# Patient Record
(demographics unavailable — no encounter records)

---

## 2024-12-26 NOTE — HISTORY OF PRESENT ILLNESS
[FreeTextEntry1] : Admission Date 30-Sep-2024 to 19-Oct-2024  Reason for Admission Left [facility]  78 y/o M with PMHx of multiple previous PCI's with stenting (x7 stents), hypertension, hyperlipidemia, diabetes presenting to [facility] on 9/15 with chest pain radiating to his left shoulder.  States he feels similar to prior episodes of angina, however less severe this time.  States during the week had multiple episodes of chest pain in the evenings after eating, however these were short and self-limited.  Last night pain started again in the evening and has been continuous but waxes and wanes in intensity. Last [facility] in 5/2024 with PCI to ostial RCA and LCx. LAD with moderate disease iFR negative 0.96. Cardiology was consulted, recommended continue with DAPT therapy and heparin gtt for unstable angina. Critical lab value ApTT >200. Hep gtt held and rate decreased. EKG showed NSR with 1st degree AV block.  Suspected NSTEMI S/p PCI with POBA/laser to 95% RCA ISR via RRA on 9/16.  Hospital course c/b RRT with AMS and increasing lethargy and worsening headache and neck pain. Emergent CTH showed acute intraparenchymal hemorrhage within the medial left temporal lobe with intraventricular extension and localized mass effect likely as a rare complication from heparin and DAPT therapy. Patient was treated and reversed the complication of heparin with  protamine sulfate, given desmopressin and SDP platelets.  Patient require intubation due to worsening mental status. Patient was transferred to NICU for further management. Urology was consulted for gross hematuria s/p ISCl, 20 Fr. Coude was placed, most likely as a result of traumatic insertion.   Patient was extubated on 9/18 and downgraded to the medical floor on 9/19. Patient had multiple  RRT on 9/21 for worsening headache treated with tylenol, new onset of blurry vision and syncope. Repeat CTH was stable. Upon further questions, the blurry vision persisted since the cadiac cath and is not new. Syncope most likely from vasovagal. Neurology cleared to resume aspirin on 9/22.  On 9/22, patient had another RRT for suspected seizure activity with BL rhythmic upper and lower extremity shaking w/ spontaneous resolution. No need vEEG per neuro. Garcia removed 9/23 and passed TOV. Hematuria resolved. ID was consulted for leukocytosis, tachycardia and rectal temp of 100.7 F. Treated empirically with Zosyn and Vanco for suspected sepsis 2/2 UTI. On 9/26, + UA and Urine cx (proteus), blood cx-neg. Continue treatment with Rocephin. CT chest showed b/l small pleural effusions, negative for PNA. Nephrology was consulted for hyponatremia 2/2 vasogenic edema. Treated with sodium chloride PO and hypertonic saline solution IVF. Patient is optimized and Patient was evaluated by PM&R and therapy for functional deficits, gait/ADL impairments and acute rehabilitation was recommended. Patient was cleared for discharge to [facility] IRU on 9/30/24.  REHAB COURSE:  Rehab Course was significant for hyponatremia and nephrology was consulted. Patient received 1 dose of tolvaptan and weaned off salt tabs.  Patient placed on fluid restriction and sodium levels improved.  Course notable for anemia likely in the setting of iron deficiency and received 2 doses of IV iron and then transition to oral supplementation.  Patient found to have e coli ESBL UTI and treated with a course of ertapenem.  Patient had symptomatic orthostatic hypotension in therapy.  Anti-hypertensive were discontinued, started on midodrine and cardiology consulted. Florinef was also added and later discontinued.  Blood pressure maintained during therapy.  Patient started on memantine for aphasia.  Patient tolerated course of inpatient PT/OT/SLP rehab with significant functional improvements and met rehab goals prior to discharge. Patient was medically stable and optimized for discharge home with follow-up with PM&R, primary care, neurology, and nephrology  Discharge Functional Status: SLP Voice (Communication): EMST-75 fair plus to good air passage Expression (Communication): convergent sentence completion 40% Comprehension (Communication): 2-step 0%, simple y/n 100%, mod y/n 70% Memory (Cognition): STM 12% Attention (Cognition): selective attention 50% Swallowing (Swallow Function): lingual control/coordination fair plus to good  PT Ambulated over 150 feet with RW and supervision; ambulated over 150 feet without device with contact guard; decreased clearance right lower extremity in swing.  Negotiated up and down 12-6 inch steps with one handrail, step-to pattern, and supervision. Negotiated up and down 6-inch curb with RW and supervision.  OT Set up -- Eating; Supervision-- grooming;  CG-- UBD/LBD; Min A shower transfers; CG toilet transfers and toileting  Patient attending outpatient PT & OT Twice weekly, Speech is TIW at [facility].   Patient is getting better with therapy and time.    Therapy Notes reviewed-- Speech therapy note 12/16--sentence completion-8 out of 12 given moderate to maximum assistance-able to complete open ended phrases with an appropriate word with notable word finding challenges and nonspecific utterances.  Required and benefited from visual support such as images and orthographic's, semantic cueing, guided use of [employer] locations and gestures and for anomic cueing. Word fluency test 8 words per prompts and 2 given maximum assist.  Patient was tasks with generating semantic believe related terms to written work stimulus word.  Benefited from semantic cueing, guided question prompts, carrier phrases,, for anomic cueing and written support.  OT note 12/16--initially demonstrated max difficulty sequencing steps-basic three-step directive-requiring step-by-step instruction to recall and follow directions-by end of tasks, amount of assistance required downgraded to minimum assistance.  He required maximum assistance to determine the appropriate section of menu to find a given item-example omelette in the breakfast session section-demonstrating poor reasoning.  Client able to total up dollar amounts with 100% accuracy with the use of a calculator.  Able to follow two-step directions with better accuracy as well as due to familiarity of tasks.  Significant difficulty with novel task continues to be noted.  PT no [id number] performed to improve balance and improve independence with all functional mobility.  On today's session focused on dynamic and static balance and lower extremity strengthening.  Appropriately challenged with EC modified tandem stance, demonstrating good tolerance to 10 seconds bouts-3 times with 5 seconds breaks before switching feet.  Patient with 2 instances of loss of balance during extraocular head turns but demonstrated good lateral stepping strategy and use of upper extremity on Boulet bars to catch balance independently.  Pt states he has word finding difficulty in [place] and English  Function: Ambulating independently at home and in community Negotiates stairs independently-- has a flight at home Grooming, dressing, standing shower, toileting-- independently  no falls.   Takes meds with help of Pill box.    Pt. stopped midodrine and tamsulosin and memantine  Current Meds: duasteride 5mg atorvastatin 80 Metoprolol 25mg Janumet 50 mg BID ASA 81mg  glimeride 4mg.

## 2024-12-26 NOTE — REASON FOR VISIT
[Follow-Up] : a follow-up visit [Spouse] : spouse [Family Member] : family member [FreeTextEntry1] : left MCA infarct

## 2024-12-26 NOTE — HISTORY OF PRESENT ILLNESS
[FreeTextEntry1] : Admission Date 30-Sep-2024 to 19-Oct-2024  Reason for Admission Left [facility]  78 y/o M with PMHx of multiple previous PCI's with stenting (x7 stents), hypertension, hyperlipidemia, diabetes presenting to [facility] on 9/15 with chest pain radiating to his left shoulder.  States he feels similar to prior episodes of angina, however less severe this time.  States during the week had multiple episodes of chest pain in the evenings after eating, however these were short and self-limited.  Last night pain started again in the evening and has been continuous but waxes and wanes in intensity. Last [facility] in 5/2024 with PCI to ostial RCA and LCx. LAD with moderate disease iFR negative 0.96. Cardiology was consulted, recommended continue with DAPT therapy and heparin gtt for unstable angina. Critical lab value ApTT >200. Hep gtt held and rate decreased. EKG showed NSR with 1st degree AV block.  Suspected NSTEMI S/p PCI with POBA/laser to 95% RCA ISR via RRA on 9/16.  Hospital course c/b RRT with AMS and increasing lethargy and worsening headache and neck pain. Emergent CTH showed acute intraparenchymal hemorrhage within the medial left temporal lobe with intraventricular extension and localized mass effect likely as a rare complication from heparin and DAPT therapy. Patient was treated and reversed the complication of heparin with  protamine sulfate, given desmopressin and SDP platelets.  Patient require intubation due to worsening mental status. Patient was transferred to NICU for further management. Urology was consulted for gross hematuria s/p ISCl, 20 Fr. Coude was placed, most likely as a result of traumatic insertion.   Patient was extubated on 9/18 and downgraded to the medical floor on 9/19. Patient had multiple  RRT on 9/21 for worsening headache treated with tylenol, new onset of blurry vision and syncope. Repeat CTH was stable. Upon further questions, the blurry vision persisted since the cadiac cath and is not new. Syncope most likely from vasovagal. Neurology cleared to resume aspirin on 9/22.  On 9/22, patient had another RRT for suspected seizure activity with BL rhythmic upper and lower extremity shaking w/ spontaneous resolution. No need vEEG per neuro. Garcia removed 9/23 and passed TOV. Hematuria resolved. ID was consulted for leukocytosis, tachycardia and rectal temp of 100.7 F. Treated empirically with Zosyn and Vanco for suspected sepsis 2/2 UTI. On 9/26, + UA and Urine cx (proteus), blood cx-neg. Continue treatment with Rocephin. CT chest showed b/l small pleural effusions, negative for PNA. Nephrology was consulted for hyponatremia 2/2 vasogenic edema. Treated with sodium chloride PO and hypertonic saline solution IVF. Patient is optimized and Patient was evaluated by PM&R and therapy for functional deficits, gait/ADL impairments and acute rehabilitation was recommended. Patient was cleared for discharge to [facility] IRU on 9/30/24.  REHAB COURSE:  Rehab Course was significant for hyponatremia and nephrology was consulted. Patient received 1 dose of tolvaptan and weaned off salt tabs.  Patient placed on fluid restriction and sodium levels improved.  Course notable for anemia likely in the setting of iron deficiency and received 2 doses of IV iron and then transition to oral supplementation.  Patient found to have e coli ESBL UTI and treated with a course of ertapenem.  Patient had symptomatic orthostatic hypotension in therapy.  Anti-hypertensive were discontinued, started on midodrine and cardiology consulted. Florinef was also added and later discontinued.  Blood pressure maintained during therapy.  Patient started on memantine for aphasia.  Patient tolerated course of inpatient PT/OT/SLP rehab with significant functional improvements and met rehab goals prior to discharge. Patient was medically stable and optimized for discharge home with follow-up with PM&R, primary care, neurology, and nephrology  Discharge Functional Status: SLP Voice (Communication): EMST-75 fair plus to good air passage Expression (Communication): convergent sentence completion 40% Comprehension (Communication): 2-step 0%, simple y/n 100%, mod y/n 70% Memory (Cognition): STM 12% Attention (Cognition): selective attention 50% Swallowing (Swallow Function): lingual control/coordination fair plus to good  PT Ambulated over 150 feet with RW and supervision; ambulated over 150 feet without device with contact guard; decreased clearance right lower extremity in swing.  Negotiated up and down 12-6 inch steps with one handrail, step-to pattern, and supervision. Negotiated up and down 6-inch curb with RW and supervision.  OT Set up -- Eating; Supervision-- grooming;  CG-- UBD/LBD; Min A shower transfers; CG toilet transfers and toileting  Patient attending outpatient PT & OT Twice weekly, Speech is TIW at [facility].   Patient is getting better with therapy and time.    Therapy Notes reviewed-- Speech therapy note 12/16--sentence completion-8 out of 12 given moderate to maximum assistance-able to complete open ended phrases with an appropriate word with notable word finding challenges and nonspecific utterances.  Required and benefited from visual support such as images and orthographic's, semantic cueing, guided use of [employer] locations and gestures and for anomic cueing. Word fluency test 8 words per prompts and 2 given maximum assist.  Patient was tasks with generating semantic believe related terms to written work stimulus word.  Benefited from semantic cueing, guided question prompts, carrier phrases,, for anomic cueing and written support.  OT note 12/16--initially demonstrated max difficulty sequencing steps-basic three-step directive-requiring step-by-step instruction to recall and follow directions-by end of tasks, amount of assistance required downgraded to minimum assistance.  He required maximum assistance to determine the appropriate section of menu to find a given item-example omelette in the breakfast session section-demonstrating poor reasoning.  Client able to total up dollar amounts with 100% accuracy with the use of a calculator.  Able to follow two-step directions with better accuracy as well as due to familiarity of tasks.  Significant difficulty with novel task continues to be noted.  PT no [id number] performed to improve balance and improve independence with all functional mobility.  On today's session focused on dynamic and static balance and lower extremity strengthening.  Appropriately challenged with EC modified tandem stance, demonstrating good tolerance to 10 seconds bouts-3 times with 5 seconds breaks before switching feet.  Patient with 2 instances of loss of balance during extraocular head turns but demonstrated good lateral stepping strategy and use of upper extremity on Boulet bars to catch balance independently.  Pt states he has word finding difficulty in [place] and English  Function: Ambulating independently at home and in community Negotiates stairs independently-- has a flight at home Grooming, dressing, standing shower, toileting-- independently  no falls.   Takes meds with help of Pill box.    Pt. stopped midodrine and tamsulosin and memantine  Current Meds: duasteride 5mg atorvastatin 80 Metoprolol 25mg Janumet 50 mg BID ASA 81mg  glimeride 4mg.

## 2024-12-26 NOTE — ASSESSMENT
[FreeTextEntry1] : Will benefit from continued Speech and OT outpatient to address aphasia, cognitive deficits and IADLs.    Pt. making great progress.  Cont. PT until completion

## 2024-12-26 NOTE — PHYSICAL EXAM
[FreeTextEntry1] : Constitutional: Alert, awake, no acute distress  Neuro: AAOx3, Recall 2/3 spontaneously.  3/3 with cues Attention--able to complete DOWB,  and serial 7's x 1  CN: intact no nystagmus, visual fields intact, PERRLA, EOMI, no facial weakness or sensory deficit, shoulder migue intact, tongue midline Motor 5/5 bilat UE and LE Sens intact bilat UE and LE Coordination intact--FNF and HTS intact  gait-- reciprocal gait pattern.

## 2025-03-07 NOTE — PHYSICAL EXAM
[FreeTextEntry1] : Constitutional: Alert, awake, no acute distress  Neuro: AAOx3, Recall 0/3 spontaneously. 1/3 with cat. cues,  Attention--able to complete DOWB, and serial 7's x 1  CN: no nystagmus, visual fields intact, PERRLA, EOMI, no facial weakness or sensory deficit, shoulder migue intact, tongue midline Motor 5/5 bilat UE and LE Sens intact bilat UE and LE Coordination intact--FNF and HTS intact  gait-- reciprocal gait pattern.

## 2025-03-07 NOTE — ASSESSMENT
[FreeTextEntry1] : Patient encouraged to continue Speech therapy -- even at least for a few more sessions to be comfortable with cognitive exercises to do at home, etc.  but pt. declines.   Agrees to do cognitive exercises recommended at home-- discussed  cognitive activities and community programs such as day programs or senior centers for cognitive activity. .  Recommended to wife that she can have patient do games, and activities that stimulate his cognitive functioning.    --wife states she will benefit from having therapy at I send information to her about specific cognitive apps, games, activities and possibly worksheets that patient can do.    All questions answered.  f/u in 6 months

## 2025-03-07 NOTE — HISTORY OF PRESENT ILLNESS
[FreeTextEntry1] : Admission Date 30-Sep-2024 to 19-Oct-2024  Reason for Admission Left [facility]  76 y/o M with PMHx of multiple previous PCI's with stenting (x7 stents), hypertension, hyperlipidemia, diabetes presenting to [facility] on 9/15 with chest pain radiating to his left shoulder. Last [facility] in 5/2024 with PCI to ostial RCA and LCx. LAD with moderate disease -- Pt. started on  DAPT therapy and heparin gtt for unstable angina.  Suspected NSTEMI S/p PCI  Hospital course c/b RRT with AMS and increasing lethargy and worsening headache and neck pain. Emergent CTH showed acute intraparenchymal hemorrhage within the medial left temporal lobe with intraventricular extension and localized mass effect likely as a rare complication from heparin and DAPT therapy. Patient was treated and reversed the complication of heparin with protamine sulfate, given desmopressin and SDP platelets. Patient required intubation due to worsening mental status. Patient was managed in NICU.  extubated on 9/18 and downgraded to the medical floor on 9/19.  On 9/22, patient had another RRT for suspected seizure activity with BL rhythmic upper and lower extremity shaking w/ spontaneous resolution. No need vEEG per neuro. Nephrology was consulted for hyponatremia 2/2 vasogenic edema. Treated with sodium chloride PO and hypertonic saline solution IVF.  Rehab Course was significant for hyponatremia and nephrology was consulted. Patient received 1 dose of tolvaptan and weaned off salt tabs. Patient placed on fluid restriction and sodium levels improved. Course notable for anemia likely in the setting of iron deficiency and received 2 doses of IV iron and then transition to oral supplementation. Patient found to have e coli ESBL UTI and treated with a course of ertapenem. Patient had symptomatic orthostatic hypotension in therapy. Anti-hypertensive were discontinued, started on midodrine and cardiology consulted. Florinef was also added and later discontinued. Blood pressure maintained during therapy. Patient started on memantine for aphasia.  pt. last seen 12/17/24.  Pt. was attending therapy at [facility].  He was discharged from PT in late Jan. as he met his goals. Pt. stopped attending OT and speech therapy the week of 2/10 as he felt he did not need it.    I spoke with pt's wife on 2/13 as she was concerned that he did not want to attend therapy anymore and how to address.  Wife advised that patient can discharge from OT as discharge had been planned for that coming week.  Had discussed with patient's OT at [facility].  They noted that his performance tends to fluctuate and is limited by cognitive deficits and poor insight.  They recommended cognitive activities and community programs such as day programs or senior centers for cognitive activity. - Patient was encouraged to continue with outpatient speech therapy as he would continue to benefit from cognitive remediation.  Patient declined.  Recommended to wife that she can have patient do games, and activities that stimulate his cognitive functioning.  She noted that his speech and cognitive functioning has improved.  Pt. notes some aphasia, dysphonia.   Pt. states it takes some time to process information and find the words he wants to say.   His comprehension is good.   Wife states his memory regarding things related to his office and  things at home is pretty good.   IADLs--  independent with medication management, knows his appointments, chores-- cleaning, etc. like normal, organizing, etc.   Pt. walks independently in the community.

## 2025-03-07 NOTE — HISTORY OF PRESENT ILLNESS
[FreeTextEntry1] : Admission Date 30-Sep-2024 to 19-Oct-2024  Reason for Admission Left [facility]  78 y/o M with PMHx of multiple previous PCI's with stenting (x7 stents), hypertension, hyperlipidemia, diabetes presenting to [facility] on 9/15 with chest pain radiating to his left shoulder. Last [facility] in 5/2024 with PCI to ostial RCA and LCx. LAD with moderate disease -- Pt. started on  DAPT therapy and heparin gtt for unstable angina.  Suspected NSTEMI S/p PCI  Hospital course c/b RRT with AMS and increasing lethargy and worsening headache and neck pain. Emergent CTH showed acute intraparenchymal hemorrhage within the medial left temporal lobe with intraventricular extension and localized mass effect likely as a rare complication from heparin and DAPT therapy. Patient was treated and reversed the complication of heparin with protamine sulfate, given desmopressin and SDP platelets. Patient required intubation due to worsening mental status. Patient was managed in NICU.  extubated on 9/18 and downgraded to the medical floor on 9/19.  On 9/22, patient had another RRT for suspected seizure activity with BL rhythmic upper and lower extremity shaking w/ spontaneous resolution. No need vEEG per neuro. Nephrology was consulted for hyponatremia 2/2 vasogenic edema. Treated with sodium chloride PO and hypertonic saline solution IVF.  Rehab Course was significant for hyponatremia and nephrology was consulted. Patient received 1 dose of tolvaptan and weaned off salt tabs. Patient placed on fluid restriction and sodium levels improved. Course notable for anemia likely in the setting of iron deficiency and received 2 doses of IV iron and then transition to oral supplementation. Patient found to have e coli ESBL UTI and treated with a course of ertapenem. Patient had symptomatic orthostatic hypotension in therapy. Anti-hypertensive were discontinued, started on midodrine and cardiology consulted. Florinef was also added and later discontinued. Blood pressure maintained during therapy. Patient started on memantine for aphasia.  pt. last seen 12/17/24.  Pt. was attending therapy at [facility].  He was discharged from PT in late Jan. as he met his goals. Pt. stopped attending OT and speech therapy the week of 2/10 as he felt he did not need it.    I spoke with pt's wife on 2/13 as she was concerned that he did not want to attend therapy anymore and how to address.  Wife advised that patient can discharge from OT as discharge had been planned for that coming week.  Had discussed with patient's OT at [facility].  They noted that his performance tends to fluctuate and is limited by cognitive deficits and poor insight.  They recommended cognitive activities and community programs such as day programs or senior centers for cognitive activity. - Patient was encouraged to continue with outpatient speech therapy as he would continue to benefit from cognitive remediation.  Patient declined.  Recommended to wife that she can have patient do games, and activities that stimulate his cognitive functioning.  She noted that his speech and cognitive functioning has improved.  Pt. notes some aphasia, dysphonia.   Pt. states it takes some time to process information and find the words he wants to say.   His comprehension is good.   Wife states his memory regarding things related to his office and  things at home is pretty good.   IADLs--  independent with medication management, knows his appointments, chores-- cleaning, etc. like normal, organizing, etc.   Pt. walks independently in the community.

## 2025-04-09 NOTE — CONSULT LETTER
[Dear  ___] : Dear  [unfilled], [Consult Letter:] : I had the pleasure of evaluating your patient, [unfilled]. [Please see my note below.] : Please see my note below. [Consult Closing:] : Thank you very much for allowing me to participate in the care of this patient.  If you have any questions, please do not hesitate to contact me. [Sincerely,] : Sincerely, [DrAndrea  ___] : Dr. MAHAJAN [DrAndrea ___] : Dr. MAHAJAN [FreeTextEntry2] : Elian Clark MD [FreeTextEntry3] : Richard B. Libman, MD, FRCPC  , Neurology  Co-Director, Stroke Center Professor of Neurology [facility] at [facility]

## 2025-04-09 NOTE — CONSULT LETTER
[Dear  ___] : Dear  [unfilled], [Consult Letter:] : I had the pleasure of evaluating your patient, [unfilled]. [Please see my note below.] : Please see my note below. [Consult Closing:] : Thank you very much for allowing me to participate in the care of this patient.  If you have any questions, please do not hesitate to contact me. [Sincerely,] : Sincerely, [DrAndrea  ___] : Dr. MAHAJAN [DrAndrea ___] : Dr. MAHAJAN [FreeTextEntry2] : Elian Clark MD [FreeTextEntry3] : Richard B. Libman, MD, FRCPC  , Neurology  Co-Director, Stroke Center Professor of Neurology [facility] at [facility]

## 2025-04-09 NOTE — HISTORY OF PRESENT ILLNESS
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  HPI from [facility] 9/16/24: He developed sudden onset of headache and altered mental status following balloon angioplasty. CT head showed intraparenchymal hemorrhage with ventricular extension on the left side. Patient was on heparin drip and dual antiplatelet therapy.   Workup includes: CT Head 9/16/24: Acute intraparenchymal hemorrhage within the medial left temporal lobe with intraventricular extension and localized mass effect. CTA Neck 9/16/24: No large vessel occlusion or significant stenosis. CTA Head 9/16/24: No cerebral aneurysm, large vessel occlusion, or significant stenosis. MRI Brain 9/17/24: Large intraparenchymal hemorrhage within the medial LEFT temporal lobe extending into the LEFT basal ganglia measuring 6.5 cm AP by 4.0 cm TRV by 3.6 cm CC. There is extension of hemorrhage into the LEFT lateral ventricle and minimally in the dependent portion of the RIGHT lateral ventricle and a small 1.5 cm intraparenchymal hematoma in the posterior inferior LEFT frontal lobe. Mild surrounding edema is noted with effacement of the LEFT lateral ventricle but no midline shift. Scattered subarachnoid hemorrhage is seen in the BILATERAL frontal, occipital and temporal lobes. Mild to moderate periventricular, deep and subcortical chronic white matter ischemia is noted. No abnormal enhancement is seen. CT Head 10/17/24: Normal temporal evolution of previously seen left basal ganglia and left frontotemporal hemorrhagic lesions. No new acute intracranial hemorrhage.  Carotid Doppler (3/25/2025): Mild heterogeneous plaque with less than 40% stenosis bilaterally. Extracranial vertebral arteries: Anterograde flow with normal flow resistance.  Transcranial Doppler (3/25/2025): Normal.  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 No acute intracranial hemorrhage, midline shift or hydrocephalus. Left mesial temporal encephalomalacia/gliosis.    4/7/25 He presents to the office today with his wife. He has residual difficulties with naming. He is independent in all ADLs, and can do everything he did prior to the stroke. 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 He denies any new focal neurologic symptoms.  PCP Dr. Elian Stewart and Dr. Steven Stewart

## 2025-04-09 NOTE — PHYSICAL EXAM
[General Appearance - Alert] : alert [General Appearance - In No Acute Distress] : in no acute distress [Impaired Insight] : insight and judgment were intact [Affect] : the affect was normal [Sclera] : the sclera and conjunctiva were normal [Extraocular Movements] : extraocular movements were intact [Full Visual Field] : full visual field [Outer Ear] : the ears and nose were normal in appearance [Examination Of The Oral Cavity] : the lips and gums were normal [Neck Appearance] : the appearance of the neck was normal [Neck Cervical Mass (___cm)] : no neck mass was observed [Jugular Venous Distention Increased] : there was no jugular-venous distention [Auscultation Breath Sounds / Voice Sounds] : lungs were clear to auscultation bilaterally [Heart Rate And Rhythm] : heart rate was normal and rhythm regular [Heart Sounds] : normal S1 and S2 [Heart Sounds Gallop] : no gallops [Murmurs] : no murmurs [Heart Sounds Pericardial Friction Rub] : no pericardial rub [Arterial Pulses Carotid] : carotid pulses were normal with no bruits [Edema] : there was no peripheral edema [Veins - Varicosity Changes] : there were no varicosital changes [No CVA Tenderness] : no ~M costovertebral angle tenderness [No Spinal Tenderness] : no spinal tenderness [Abnormal Walk] : normal gait [Nail Clubbing] : no clubbing  or cyanosis of the fingernails [Musculoskeletal - Swelling] : no joint swelling seen [Motor Tone] : muscle strength and tone were normal [Skin Color & Pigmentation] : normal skin color and pigmentation [Skin Turgor] : normal skin turgor [] : no rash [FreeTextEntry1] : Generally looked well. Mental status exam: Alert, oriented x3, speech fluent/prosodic without paraphasias; mild to moderate anomia; repetition is mild to moderately impaired; followed crossed commands; memory and fund of knowledge intact. On cranial nerve exam, I was unable to see the fundi; there was a right superior quadrantanopia;  the remainder of cranial nerves II through XII were intact. On motor exam tone was normal. There was no drift. Fine finger movements are intact. Power was normal throughout. Reflexes were 2+ throughout, and plantar reflexes were downgoing. Coordination at the arms was normal. Gait was mildly unsteady; he turned in 3 steps. Tandem gait was normal. Romberg test was negative. Sensory exam was intact to all modalities.

## 2025-04-09 NOTE — CONSULT LETTER
[Dear  ___] : Dear  [unfilled], [Consult Letter:] : I had the pleasure of evaluating your patient, [unfilled]. [Please see my note below.] : Please see my note below. [Consult Closing:] : Thank you very much for allowing me to participate in the care of this patient.  If you have any questions, please do not hesitate to contact me. [Sincerely,] : Sincerely, [DrAndrea  ___] : Dr. MAHAJAN [DrAndrea ___] : Dr. MAHAJAN [FreeTextEntry2] : Elian Clark MD [FreeTextEntry3] : Richard B. Libman, MD, FRCPC  , Neurology  Co-Director, Stroke Center Professor of Neurology [facility] at [facility]

## 2025-04-09 NOTE — HISTORY OF PRESENT ILLNESS
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  HPI from [facility] 9/16/24: He developed sudden onset of headache and altered mental status following balloon angioplasty. CT head showed intraparenchymal hemorrhage with ventricular extension on the left side. Patient was on heparin drip and dual antiplatelet therapy.   Workup includes: CT Head 9/16/24: Acute intraparenchymal hemorrhage within the medial left temporal lobe with intraventricular extension and localized mass effect. CTA Neck 9/16/24: No large vessel occlusion or significant stenosis. CTA Head 9/16/24: No cerebral aneurysm, large vessel occlusion, or significant stenosis. MRI Brain 9/17/24: Large intraparenchymal hemorrhage within the medial LEFT temporal lobe extending into the LEFT basal ganglia measuring 6.5 cm AP by 4.0 cm TRV by 3.6 cm CC. There is extension of hemorrhage into the LEFT lateral ventricle and minimally in the dependent portion of the RIGHT lateral ventricle and a small 1.5 cm intraparenchymal hematoma in the posterior inferior LEFT frontal lobe. Mild surrounding edema is noted with effacement of the LEFT lateral ventricle but no midline shift. Scattered subarachnoid hemorrhage is seen in the BILATERAL frontal, occipital and temporal lobes. Mild to moderate periventricular, deep and subcortical chronic white matter ischemia is noted. No abnormal enhancement is seen. CT Head 10/17/24: Normal temporal evolution of previously seen left basal ganglia and left frontotemporal hemorrhagic lesions. No new acute intracranial hemorrhage.  Carotid Doppler (3/25/2025): Mild heterogeneous plaque with less than 40% stenosis bilaterally. Extracranial vertebral arteries: Anterograde flow with normal flow resistance.  Transcranial Doppler (3/25/2025): Normal.  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 No acute intracranial hemorrhage, midline shift or hydrocephalus. Left mesial temporal encephalomalacia/gliosis.    4/7/25 He presents to the office today with his wife. He has residual difficulties with naming. He is independent in all ADLs, and can do everything he did prior to the stroke. 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 He denies any new focal neurologic symptoms.  PCP Dr. Elian Stewart and Dr. Steven Stewart

## 2025-04-09 NOTE — DISCUSSION/SUMMARY
[FreeTextEntry1] : 4/7/25. 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 He is otherwise neurologically intact.  To summarize, on 9/15/24, he had a balloon angioplasty. After the procedure, he developed sudden onset of headache and altered mental status due to an acute intraparenchymal hemorrhage within the medial left temporal lobe, likely due to anticoagulation with heparin drip and dual antiplatelet therapy.  Dopplers on 3/25/25 were unremarkable with mild ICA stenosis bilaterally.  He questions whether it is safe to resume driving. I recommended a formal driving evaluation; he will do this, when he feels ready.    He should benefit from resuming speech therapy; referral provided.  He may have an upcoming cataract removal. From a neurovascular standpoint, he can proceed with the surgery. He will also discuss this with cardiology, as he may need to stop the Aspirin prior to the surgery.  He can follow up in 3 months. I hope he remains free of further serious trouble.

## 2025-04-09 NOTE — HISTORY OF PRESENT ILLNESS
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  HPI from [facility] 9/16/24: He developed sudden onset of headache and altered mental status following balloon angioplasty. CT head showed intraparenchymal hemorrhage with ventricular extension on the left side. Patient was on heparin drip and dual antiplatelet therapy.   Workup includes: CT Head 9/16/24: Acute intraparenchymal hemorrhage within the medial left temporal lobe with intraventricular extension and localized mass effect. CTA Neck 9/16/24: No large vessel occlusion or significant stenosis. CTA Head 9/16/24: No cerebral aneurysm, large vessel occlusion, or significant stenosis. MRI Brain 9/17/24: Large intraparenchymal hemorrhage within the medial LEFT temporal lobe extending into the LEFT basal ganglia measuring 6.5 cm AP by 4.0 cm TRV by 3.6 cm CC. There is extension of hemorrhage into the LEFT lateral ventricle and minimally in the dependent portion of the RIGHT lateral ventricle and a small 1.5 cm intraparenchymal hematoma in the posterior inferior LEFT frontal lobe. Mild surrounding edema is noted with effacement of the LEFT lateral ventricle but no midline shift. Scattered subarachnoid hemorrhage is seen in the BILATERAL frontal, occipital and temporal lobes. Mild to moderate periventricular, deep and subcortical chronic white matter ischemia is noted. No abnormal enhancement is seen. CT Head 10/17/24: Normal temporal evolution of previously seen left basal ganglia and left frontotemporal hemorrhagic lesions. No new acute intracranial hemorrhage.  Carotid Doppler (3/25/2025): Mild heterogeneous plaque with less than 40% stenosis bilaterally. Extracranial vertebral arteries: Anterograde flow with normal flow resistance.  Transcranial Doppler (3/25/2025): Normal.  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 No acute intracranial hemorrhage, midline shift or hydrocephalus. Left mesial temporal encephalomalacia/gliosis.    4/7/25 He presents to the office today with his wife. He has residual difficulties with naming. He is independent in all ADLs, and can do everything he did prior to the stroke. 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 He denies any new focal neurologic symptoms.  PCP Dr. Elian Stewart and Dr. Steven Stewart

## 2025-07-24 NOTE — CONSULT LETTER
[Dear  ___] : Dear  [unfilled], [Consult Letter:] : I had the pleasure of evaluating your patient, [unfilled]. [Please see my note below.] : Please see my note below. [Consult Closing:] : Thank you very much for allowing me to participate in the care of this patient.  If you have any questions, please do not hesitate to contact me. [Sincerely,] : Sincerely, [DrAndrea  ___] : Dr. MAHAJAN [DrAndera ___] : Dr. MAHAJAN [FreeTextEntry2] : Elian Clark MD [FreeTextEntry3] : Richard B. Libman, MD, FRCPC  , Neurology  Co-Director, Stroke Center Professor of Neurology [facility] at [facility]

## 2025-07-24 NOTE — DISCUSSION/SUMMARY
[FreeTextEntry1] : 4/7/25. 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 He is otherwise neurologically intact.  To summarize, on 9/15/24, he had a balloon angioplasty. After the procedure, he developed sudden onset of headache and altered mental status due to an acute intraparenchymal hemorrhage within the medial left temporal lobe, likely due to anticoagulation with heparin drip and dual antiplatelet therapy.  Dopplers on 3/25/25 were unremarkable with mild ICA stenosis bilaterally.  He questions whether it is safe to resume driving. I recommended a formal driving evaluation; he will do this, when he feels ready.    He should benefit from resuming speech therapy; referral provided.  He may have an upcoming cataract removal. From a neurovascular standpoint, he can proceed with the surgery. He will also discuss this with cardiology, as he may need to stop the Aspirin prior to the surgery.  7/10/25 - His right superior quadrantanopia and mild anomic aphasia have slightly improved since prior visit. He is otherwise neurologically stable and doing very well. - He is scheduled to have a prostate artery embolization, which would require some degree of heparinization.  I recommend using the smallest amount of Heparin necessary for the procedure, and most likely the benefits of the procedure will outweigh the risks.  - He should benefit from resuming speech therapy; referral provided. - He questions whether it is safe to resume driving. I recommended a formal driving evaluation; Referral provided.   He can follow up in 3-4 months with repeat Dopplers. I hope he remains free of further serious trouble.

## 2025-07-24 NOTE — HISTORY OF PRESENT ILLNESS
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  HPI from [facility] 9/16/24: He developed sudden onset of headache and altered mental status following balloon angioplasty. CT head showed intraparenchymal hemorrhage with ventricular extension on the left side. Patient was on heparin drip and dual antiplatelet therapy.   Workup includes: CT Head 9/16/24: Acute intraparenchymal hemorrhage within the medial left temporal lobe with intraventricular extension and localized mass effect. CTA Neck 9/16/24: No large vessel occlusion or significant stenosis. CTA Head 9/16/24: No cerebral aneurysm, large vessel occlusion, or significant stenosis. MRI Brain 9/17/24: Large intraparenchymal hemorrhage within the medial LEFT temporal lobe extending into the LEFT basal ganglia measuring 6.5 cm AP by 4.0 cm TRV by 3.6 cm CC. There is extension of hemorrhage into the LEFT lateral ventricle and minimally in the dependent portion of the RIGHT lateral ventricle and a small 1.5 cm intraparenchymal hematoma in the posterior inferior LEFT frontal lobe. Mild surrounding edema is noted with effacement of the LEFT lateral ventricle but no midline shift. Scattered subarachnoid hemorrhage is seen in the BILATERAL frontal, occipital and temporal lobes. Mild to moderate periventricular, deep and subcortical chronic white matter ischemia is noted. No abnormal enhancement is seen. CT Head 10/17/24: Normal temporal evolution of previously seen left basal ganglia and left frontotemporal hemorrhagic lesions. No new acute intracranial hemorrhage.  Carotid Doppler (3/25/2025): Mild heterogeneous plaque with less than 40% stenosis bilaterally. Extracranial vertebral arteries: Anterograde flow with normal flow resistance.  Transcranial Doppler (3/25/2025): Normal.  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 No acute intracranial hemorrhage, midline shift or hydrocephalus. Left mesial temporal encephalomalacia/gliosis.    4/7/25 He presents to the office today with his wife. He has residual difficulties with naming. He is independent in all ADLs, and can do everything he did prior to the stroke. 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 He denies any new focal neurologic symptoms.  Outside CT head (3/11/2025) reviewed: to my eye (and as officially reported) showed encephalomalacia in the left temporal region; moderate or moderate-severe periventricular chronic ischemic changes.  Outside CT head (3/11/2025) showed encephalomalacia in the left temporal region, a sequela of his previous ICH; signs of at least moderate chronic small vessel disease. He should benefit from ongoing careful management of vascular risk factors.  7/10/25. He presents to the office today with his wife. He feels that his speech has been improving. He denies any new focal neurologic symptoms.   PCP Dr. Elian Stewart and Dr. Steven Stewart IR Dr. Ortega Martin

## 2025-07-24 NOTE — HISTORY OF PRESENT ILLNESS
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  HPI from [facility] 9/16/24: He developed sudden onset of headache and altered mental status following balloon angioplasty. CT head showed intraparenchymal hemorrhage with ventricular extension on the left side. Patient was on heparin drip and dual antiplatelet therapy.   Workup includes: CT Head 9/16/24: Acute intraparenchymal hemorrhage within the medial left temporal lobe with intraventricular extension and localized mass effect. CTA Neck 9/16/24: No large vessel occlusion or significant stenosis. CTA Head 9/16/24: No cerebral aneurysm, large vessel occlusion, or significant stenosis. MRI Brain 9/17/24: Large intraparenchymal hemorrhage within the medial LEFT temporal lobe extending into the LEFT basal ganglia measuring 6.5 cm AP by 4.0 cm TRV by 3.6 cm CC. There is extension of hemorrhage into the LEFT lateral ventricle and minimally in the dependent portion of the RIGHT lateral ventricle and a small 1.5 cm intraparenchymal hematoma in the posterior inferior LEFT frontal lobe. Mild surrounding edema is noted with effacement of the LEFT lateral ventricle but no midline shift. Scattered subarachnoid hemorrhage is seen in the BILATERAL frontal, occipital and temporal lobes. Mild to moderate periventricular, deep and subcortical chronic white matter ischemia is noted. No abnormal enhancement is seen. CT Head 10/17/24: Normal temporal evolution of previously seen left basal ganglia and left frontotemporal hemorrhagic lesions. No new acute intracranial hemorrhage.  Carotid Doppler (3/25/2025): Mild heterogeneous plaque with less than 40% stenosis bilaterally. Extracranial vertebral arteries: Anterograde flow with normal flow resistance.  Transcranial Doppler (3/25/2025): Normal.  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 No acute intracranial hemorrhage, midline shift or hydrocephalus. Left mesial temporal encephalomalacia/gliosis.    4/7/25 He presents to the office today with his wife. He has residual difficulties with naming. He is independent in all ADLs, and can do everything he did prior to the stroke. 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 He denies any new focal neurologic symptoms.  Outside CT head (3/11/2025) reviewed: to my eye (and as officially reported) showed encephalomalacia in the left temporal region; moderate or moderate-severe periventricular chronic ischemic changes.  Outside CT head (3/11/2025) showed encephalomalacia in the left temporal region, a sequela of his previous ICH; signs of at least moderate chronic small vessel disease. He should benefit from ongoing careful management of vascular risk factors.  7/10/25. He presents to the office today with his wife. He feels that his speech has been improving. He denies any new focal neurologic symptoms.   PCP Dr. Elian Stewart and Dr. Steven Stewart IR Dr. Ortega aMrtin

## 2025-07-24 NOTE — PHYSICAL EXAM
[General Appearance - Alert] : alert [General Appearance - In No Acute Distress] : in no acute distress [Impaired Insight] : insight and judgment were intact [Affect] : the affect was normal [Sclera] : the sclera and conjunctiva were normal [Extraocular Movements] : extraocular movements were intact [Full Visual Field] : full visual field [Outer Ear] : the ears and nose were normal in appearance [Examination Of The Oral Cavity] : the lips and gums were normal [Neck Appearance] : the appearance of the neck was normal [Neck Cervical Mass (___cm)] : no neck mass was observed [Jugular Venous Distention Increased] : there was no jugular-venous distention [Auscultation Breath Sounds / Voice Sounds] : lungs were clear to auscultation bilaterally [Heart Rate And Rhythm] : heart rate was normal and rhythm regular [Heart Sounds] : normal S1 and S2 [Heart Sounds Gallop] : no gallops [Murmurs] : no murmurs [Heart Sounds Pericardial Friction Rub] : no pericardial rub [Arterial Pulses Carotid] : carotid pulses were normal with no bruits [Edema] : there was no peripheral edema [Veins - Varicosity Changes] : there were no varicosital changes [No CVA Tenderness] : no ~M costovertebral angle tenderness [No Spinal Tenderness] : no spinal tenderness [Abnormal Walk] : normal gait [Nail Clubbing] : no clubbing  or cyanosis of the fingernails [Musculoskeletal - Swelling] : no joint swelling seen [Motor Tone] : muscle strength and tone were normal [Skin Color & Pigmentation] : normal skin color and pigmentation [Skin Turgor] : normal skin turgor [] : no rash [FreeTextEntry1] : Generally looked well. Mental status exam: Alert, oriented x3, speech fluent/prosodic without paraphasias; mild anomia; repetition is mild to moderately impaired; occasional circumlocutions; followed crossed commands; memory and fund of knowledge intact. On cranial nerve exam, I was unable to see the fundi; there was a mild right superior quadrantanopia, there was mild flattening of the right nasolabial fold and he has moderate hearing loss;  the remainder of cranial nerves II through XII were intact. On motor exam tone was normal. There was no drift. Fine finger movements are intact. Moderate action tremor at the arms, L>R. Power was normal throughout. Reflexes were 2+ throughout, and plantar reflexes were downgoing. Coordination at the arms was normal. Gait was mildly unsteady; he turned in 3 steps. Tandem gait was normal. Romberg test was negative. Sensory exam was intact to all modalities.

## 2025-07-24 NOTE — CONSULT LETTER
[Dear  ___] : Dear  [unfilled], [Consult Letter:] : I had the pleasure of evaluating your patient, [unfilled]. [Please see my note below.] : Please see my note below. [Consult Closing:] : Thank you very much for allowing me to participate in the care of this patient.  If you have any questions, please do not hesitate to contact me. [Sincerely,] : Sincerely, [DrAndrea  ___] : Dr. MAHAJAN [DrAndrea ___] : Dr. MAHAJAN [FreeTextEntry2] : Elian Clark MD [FreeTextEntry3] : Richard B. Libman, MD, FRCPC  , Neurology  Co-Director, Stroke Center Professor of Neurology [facility] [employer] at [facility]